# Patient Record
Sex: FEMALE | Race: OTHER | Employment: UNEMPLOYED | URBAN - METROPOLITAN AREA
[De-identification: names, ages, dates, MRNs, and addresses within clinical notes are randomized per-mention and may not be internally consistent; named-entity substitution may affect disease eponyms.]

---

## 2022-06-24 ENCOUNTER — HOSPITAL ENCOUNTER (EMERGENCY)
Age: 22
Discharge: HOME | End: 2022-06-24
Attending: STUDENT IN AN ORGANIZED HEALTH CARE EDUCATION/TRAINING PROGRAM

## 2022-06-24 ENCOUNTER — HOSPITAL ENCOUNTER (EMERGENCY)
Dept: CT IMAGING | Age: 22
Discharge: HOME | End: 2022-06-27

## 2022-06-24 ENCOUNTER — HOSPITAL ENCOUNTER (EMERGENCY)
Dept: GENERAL RADIOLOGY | Age: 22
Discharge: HOME | End: 2022-06-27

## 2022-06-24 VITALS
SYSTOLIC BLOOD PRESSURE: 101 MMHG | WEIGHT: 125 LBS | RESPIRATION RATE: 15 BRPM | TEMPERATURE: 98 F | HEIGHT: 64 IN | BODY MASS INDEX: 21.34 KG/M2 | OXYGEN SATURATION: 98 % | HEART RATE: 90 BPM | DIASTOLIC BLOOD PRESSURE: 60 MMHG

## 2022-06-24 DIAGNOSIS — S80.02XA CONTUSION OF LEFT KNEE, INITIAL ENCOUNTER: Primary | ICD-10-CM

## 2022-06-24 PROCEDURE — 90714 TD VACC NO PRESV 7 YRS+ IM: CPT | Performed by: PHYSICIAN ASSISTANT

## 2022-06-24 PROCEDURE — 73700 CT LOWER EXTREMITY W/O DYE: CPT

## 2022-06-24 PROCEDURE — 90471 IMMUNIZATION ADMIN: CPT | Performed by: PHYSICIAN ASSISTANT

## 2022-06-24 PROCEDURE — 6370000000 HC RX 637 (ALT 250 FOR IP): Performed by: PHYSICIAN ASSISTANT

## 2022-06-24 PROCEDURE — 90471 IMMUNIZATION ADMIN: CPT

## 2022-06-24 PROCEDURE — 6360000002 HC RX W HCPCS: Performed by: PHYSICIAN ASSISTANT

## 2022-06-24 PROCEDURE — 99284 EMERGENCY DEPT VISIT MOD MDM: CPT

## 2022-06-24 PROCEDURE — 73562 X-RAY EXAM OF KNEE 3: CPT

## 2022-06-24 PROCEDURE — 73552 X-RAY EXAM OF FEMUR 2/>: CPT

## 2022-06-24 RX ORDER — IBUPROFEN 800 MG/1
800 TABLET ORAL
Qty: 21 TABLET | Refills: 0 | Status: SHIPPED | OUTPATIENT
Start: 2022-06-24 | End: 2022-07-01

## 2022-06-24 RX ORDER — HYDROCODONE BITARTRATE AND ACETAMINOPHEN 5; 325 MG/1; MG/1
1 TABLET ORAL
Status: COMPLETED | OUTPATIENT
Start: 2022-06-24 | End: 2022-06-24

## 2022-06-24 RX ORDER — TETANUS AND DIPHTHERIA TOXOIDS ADSORBED 2; 2 [LF]/.5ML; [LF]/.5ML
0.5 INJECTION INTRAMUSCULAR
Status: COMPLETED | OUTPATIENT
Start: 2022-06-24 | End: 2022-06-24

## 2022-06-24 RX ADMIN — HYDROCODONE BITARTRATE AND ACETAMINOPHEN 1 TABLET: 5; 325 TABLET ORAL at 15:22

## 2022-06-24 RX ADMIN — TETANUS AND DIPHTHERIA TOXOIDS ADSORBED 0.5 ML: 2; 2 INJECTION INTRAMUSCULAR at 17:06

## 2022-06-24 ASSESSMENT — PAIN - FUNCTIONAL ASSESSMENT: PAIN_FUNCTIONAL_ASSESSMENT: 0-10

## 2022-06-24 ASSESSMENT — ENCOUNTER SYMPTOMS
COLOR CHANGE: 0
SHORTNESS OF BREATH: 0

## 2022-06-24 ASSESSMENT — PAIN SCALES - GENERAL: PAINLEVEL_OUTOF10: 10

## 2022-06-24 NOTE — ED TRIAGE NOTES
Pt states she fell downstairs on Wednesday. Pt went to urgent care and had xray of her left knee and was told it was not broken. Pt states she is concerned for a tear. Pt also with laceration to top of knee.

## 2022-06-24 NOTE — ED PROVIDER NOTES
[facility] Emergency Department Provider Note                   PCP:                None Provider               Age: 19 y.o. Sex: female       ICD-10-CM    1. Contusion of left knee, initial encounter  S80. 02XA        DISPOSITION Decision To Discharge 06/24/2022 04:38:04 PM       Discharge Medication List as of 6/24/2022  4:57 PM      START taking these medications    Details   ibuprofen (ADVIL;MOTRIN) 800 MG tablet Take 1 tablet by mouth 3 times daily (with meals) for 7 days, Disp-21 tablet, R-0Print             Orders Placed This Encounter   Procedures    XR FEMUR LEFT (MIN 2 VIEWS)    XR KNEE LEFT (3 VIEWS)    CT KNEE LEFT WO CONTRAST    Apply dressing    Knee Immobilizer, Velcro    CRUTCHES WITH INSTRUCTIONS        DONOVAN BRISCOE 8:35 PM      MDM  Number of Diagnoses or Management Options  Contusion of left knee, initial encounter  Diagnosis management comments: In summary this a well-appearing 70-year-old female presenting today for evaluation of left knee injury. She fell 2 days ago, was seen at urgent care but has had progressively worsening pain and swelling since then. On exam there is swelling noted to the left knee with generalized tenderness. There is a small abrasion distally to the knee, no signs of secondary infection. There is no erythema or warmth overlying the joint. X-ray negative for fracture but given her worsening symptoms CT scan was performed and is also negative for fracture or large effusion. There is some soft tissue swelling present suggestive of contusion versus cellulitis. Given the mechanism of injury, no erythema or warmth feel exam is more consistent with a contusion than an infectious etiology. Patient placed in knee immobilizer, given crutches, will ice and elevate and follow-up with orthopedics.        Amount and/or Complexity of Data Reviewed  Tests in the radiology section of CPT®: ordered and reviewed    Patient Progress  Patient progress: steve Sarmiento is a 19 y.o. female who presents to the Emergency Department with chief complaint of    Chief Complaint   Patient presents with    Knee Injury    Laceration      78-year-old well-appearing female presents today for evaluation of left knee and leg pain. She states that 2 days ago she tripped and fell down the stairs and suffered an abrasion to the left knee. She was evaluated at [facility] urgent care and reportedly had x-rays performed that did not show any signs of fracture and she was discharged home. She states that her pain has been progressively worsening and she is unable to bear weight on the left leg due to pain. She states that she has had increased amount of swelling to the left knee and thigh as well. She denies any associated hip pain or back pain. No pain in the ankle or foot. No numbness or tingling distally. She states she is unable to flex or extend the knee due to her significant amount of pain and swelling. She denies any history of previous injury or surgery to this knee. Has been applying ice and heat without any relief. Review of Systems   Constitutional: Negative for chills, fatigue and fever. Respiratory: Negative for shortness of breath. Cardiovascular: Negative for chest pain. Musculoskeletal: Positive for gait problem and joint swelling. Left knee pain and swelling   Skin: Negative for color change. Allergic/Immunologic: Negative for immunocompromised state. Neurological: Negative for weakness, numbness and headaches. Hematological: Does not bruise/bleed easily. History reviewed. No pertinent past medical history. History reviewed. No pertinent surgical history. History reviewed. No pertinent family history.         Social Connections:     Frequency of Communication with Friends and Family: Not on file    Frequency of Social Gatherings with Friends and Family: Not on file    Attends Yarsanism Services: Not on file   [id number] [facility] [employer] or Organizations: Not on file    Attends Club or Organization Meetings: Not on file    Marital Status: Not on file        No Known Allergies     Vitals signs and nursing note reviewed. Patient Vitals for the past 4 hrs:   Temp Pulse Resp BP SpO2   06/24/22 1715 98 °F (36.7 °C) 90 15 101/60 98 %          Physical Exam  Vitals and nursing note reviewed. Constitutional:       General: She is not in acute distress. Appearance: Normal appearance. HENT:      Head: Normocephalic and atraumatic. Eyes:      Conjunctiva/sclera: Conjunctivae normal.   Pulmonary:      Effort: Pulmonary effort is normal.   Musculoskeletal:      Left upper leg: Swelling and tenderness present. Left knee: Effusion present. Decreased range of motion. Tenderness present. Left lower leg: Tenderness present. Left ankle: No swelling. Normal pulse. Left foot: Normal capillary refill. No swelling. Normal pulse. Skin:     General: Skin is warm and dry. Capillary Refill: Capillary refill takes less than 2 seconds. Neurological:      General: No focal deficit present. Mental Status: She is alert and oriented to person, place, and time. Sensory: No sensory deficit. Gait: Gait abnormal (unable to bear weight due to pain). Psychiatric:         Mood and Affect: Mood normal.         Thought Content: Thought content normal.         Judgment: Judgment normal.          Procedures      Labs Reviewed - No data to display     CT KNEE LEFT WO CONTRAST   Final Result   1. Moderate amount of superficial anterior soft tissue swelling more in keeping   with cellulitis or soft tissue contusion. 2. No bony abnormality or intra-articular joint derangement of the left knee. 2. Only a trace joint effusion. XR FEMUR LEFT (MIN 2 VIEWS)   Final Result   Negative left femur and left knee. XR KNEE LEFT (3 VIEWS)   Final Result   Negative left femur and left knee. Voice dictation software was used during the making of this note. This software is not perfect and grammatical and other typographical errors may be present. This note has not been completely proofread for errors.      Dot Jorge  06/24/22 2034

## 2022-06-24 NOTE — ED NOTES
I have reviewed discharge instructions with the patient. The patient verbalized understanding. Patient left ED via Discharge Method: ambulatory to Home with (insert name of family/friend, self, transport friend). Opportunity for questions and clarification provided. Pt wanted to use crutches instead of wheelchair to walk out of er. Patient given 1 scripts. To continue your aftercare when you leave the hospital, you may receive an automated call from our care team to check in on how you are doing. This is a free service and part of our promise to provide the best care and service to meet your aftercare needs.  If you have questions, or wish to unsubscribe from this service please call [phone]. Thank you for Choosing our [facility] Emergency Department.         Erwin Medina RN  06/24/22 [id number]